# Patient Record
Sex: MALE | Race: WHITE | NOT HISPANIC OR LATINO | URBAN - METROPOLITAN AREA
[De-identification: names, ages, dates, MRNs, and addresses within clinical notes are randomized per-mention and may not be internally consistent; named-entity substitution may affect disease eponyms.]

---

## 2017-10-12 ENCOUNTER — EMERGENCY (EMERGENCY)
Facility: HOSPITAL | Age: 52
LOS: 1 days | Discharge: OTHER INSTITUTION | End: 2017-10-12
Attending: EMERGENCY MEDICINE | Admitting: EMERGENCY MEDICINE
Payer: MEDICAID

## 2017-10-12 ENCOUNTER — EMERGENCY (EMERGENCY)
Facility: HOSPITAL | Age: 52
LOS: 1 days | Discharge: HOME | End: 2017-10-12
Attending: EMERGENCY MEDICINE | Admitting: EMERGENCY MEDICINE
Payer: COMMERCIAL

## 2017-10-12 VITALS
SYSTOLIC BLOOD PRESSURE: 143 MMHG | DIASTOLIC BLOOD PRESSURE: 91 MMHG | TEMPERATURE: 98 F | OXYGEN SATURATION: 100 % | RESPIRATION RATE: 16 BRPM | HEART RATE: 68 BPM

## 2017-10-12 VITALS
RESPIRATION RATE: 16 BRPM | SYSTOLIC BLOOD PRESSURE: 159 MMHG | TEMPERATURE: 98 F | OXYGEN SATURATION: 100 % | DIASTOLIC BLOOD PRESSURE: 90 MMHG | HEART RATE: 54 BPM

## 2017-10-12 VITALS
TEMPERATURE: 99 F | RESPIRATION RATE: 19 BRPM | DIASTOLIC BLOOD PRESSURE: 80 MMHG | SYSTOLIC BLOOD PRESSURE: 148 MMHG | OXYGEN SATURATION: 99 % | HEART RATE: 67 BPM

## 2017-10-12 VITALS
RESPIRATION RATE: 17 BRPM | SYSTOLIC BLOOD PRESSURE: 156 MMHG | DIASTOLIC BLOOD PRESSURE: 104 MMHG | TEMPERATURE: 98 F | HEART RATE: 70 BPM

## 2017-10-12 DIAGNOSIS — S51.801A UNSPECIFIED OPEN WOUND OF RIGHT FOREARM, INITIAL ENCOUNTER: ICD-10-CM

## 2017-10-12 DIAGNOSIS — L08.9 LOCAL INFECTION OF THE SKIN AND SUBCUTANEOUS TISSUE, UNSPECIFIED: ICD-10-CM

## 2017-10-12 DIAGNOSIS — M79.631 PAIN IN RIGHT FOREARM: ICD-10-CM

## 2017-10-12 DIAGNOSIS — B99.9 UNSPECIFIED INFECTIOUS DISEASE: ICD-10-CM

## 2017-10-12 DIAGNOSIS — F17.210 NICOTINE DEPENDENCE, CIGARETTES, UNCOMPLICATED: ICD-10-CM

## 2017-10-12 LAB
ALBUMIN SERPL ELPH-MCNC: 3 G/DL — LOW (ref 3.4–5)
ALP SERPL-CCNC: 108 U/L — SIGNIFICANT CHANGE UP (ref 40–120)
ALT FLD-CCNC: 20 U/L — SIGNIFICANT CHANGE UP (ref 12–42)
ANION GAP SERPL CALC-SCNC: 4 MMOL/L — LOW (ref 9–16)
APTT BLD: 33.1 SEC — SIGNIFICANT CHANGE UP (ref 27.5–37.4)
AST SERPL-CCNC: 25 U/L — SIGNIFICANT CHANGE UP (ref 15–37)
BASOPHILS NFR BLD AUTO: 0.5 % — SIGNIFICANT CHANGE UP (ref 0–2)
BASOPHILS NFR BLD AUTO: 0.5 % — SIGNIFICANT CHANGE UP (ref 0–2)
BILIRUB SERPL-MCNC: 0.2 MG/DL — SIGNIFICANT CHANGE UP (ref 0.2–1.2)
BLD GP AB SCN SERPL QL: NEGATIVE — SIGNIFICANT CHANGE UP
BUN SERPL-MCNC: 16 MG/DL — SIGNIFICANT CHANGE UP (ref 7–23)
CALCIUM SERPL-MCNC: 8.8 MG/DL — SIGNIFICANT CHANGE UP (ref 8.5–10.5)
CHLORIDE SERPL-SCNC: 104 MMOL/L — SIGNIFICANT CHANGE UP (ref 96–108)
CK SERPL-CCNC: 76 U/L — SIGNIFICANT CHANGE UP (ref 39–308)
CO2 SERPL-SCNC: 32 MMOL/L — HIGH (ref 22–31)
CREAT SERPL-MCNC: 1.05 MG/DL — SIGNIFICANT CHANGE UP (ref 0.5–1.3)
EOSINOPHIL NFR BLD AUTO: 6 % — SIGNIFICANT CHANGE UP (ref 0–6)
EOSINOPHIL NFR BLD AUTO: 6.7 % — HIGH (ref 0–6)
GLUCOSE SERPL-MCNC: 120 MG/DL — HIGH (ref 70–99)
HCT VFR BLD CALC: 35.3 % — LOW (ref 39–50)
HCT VFR BLD CALC: 36.7 % — LOW (ref 39–50)
HGB BLD-MCNC: 11 G/DL — LOW (ref 13–17)
HGB BLD-MCNC: 11.2 G/DL — LOW (ref 13–17)
IMM GRANULOCYTES NFR BLD AUTO: 0.2 % — SIGNIFICANT CHANGE UP (ref 0–1.5)
INR BLD: 0.96 — SIGNIFICANT CHANGE UP (ref 0.88–1.16)
LACTATE SERPL-SCNC: 1.3 MMOL/L — SIGNIFICANT CHANGE UP (ref 0.5–2)
LACTATE SERPL-SCNC: 3 MMOL/L — HIGH (ref 0.4–2)
LYMPHOCYTES # BLD AUTO: 29.6 % — SIGNIFICANT CHANGE UP (ref 13–44)
LYMPHOCYTES # BLD AUTO: 32.5 % — SIGNIFICANT CHANGE UP (ref 13–44)
MCHC RBC-ENTMCNC: 25 PG — LOW (ref 27–34)
MCHC RBC-ENTMCNC: 25.9 PG — LOW (ref 27–34)
MCHC RBC-ENTMCNC: 30.5 G/DL — LOW (ref 32–36)
MCHC RBC-ENTMCNC: 31.2 G/DL — LOW (ref 32–36)
MCV RBC AUTO: 81.9 FL — SIGNIFICANT CHANGE UP (ref 80–100)
MCV RBC AUTO: 83.1 FL — SIGNIFICANT CHANGE UP (ref 80–100)
MONOCYTES NFR BLD AUTO: 8.2 % — SIGNIFICANT CHANGE UP (ref 2–14)
MONOCYTES NFR BLD AUTO: 9.2 % — SIGNIFICANT CHANGE UP (ref 2–14)
NEUTROPHILS NFR BLD AUTO: 51.9 % — SIGNIFICANT CHANGE UP (ref 43–77)
NEUTROPHILS NFR BLD AUTO: 54.7 % — SIGNIFICANT CHANGE UP (ref 43–77)
PLATELET # BLD AUTO: 253 K/UL — SIGNIFICANT CHANGE UP (ref 150–400)
PLATELET # BLD AUTO: 284 K/UL — SIGNIFICANT CHANGE UP (ref 150–400)
POTASSIUM SERPL-MCNC: 4.4 MMOL/L — SIGNIFICANT CHANGE UP (ref 3.5–5.3)
POTASSIUM SERPL-SCNC: 4.4 MMOL/L — SIGNIFICANT CHANGE UP (ref 3.5–5.3)
PROT SERPL-MCNC: 7.4 G/DL — SIGNIFICANT CHANGE UP (ref 6.4–8.2)
PROTHROM AB SERPL-ACNC: 10.7 SEC — SIGNIFICANT CHANGE UP (ref 9.8–12.7)
RBC # BLD: 4.25 M/UL — SIGNIFICANT CHANGE UP (ref 4.2–5.8)
RBC # BLD: 4.48 M/UL — SIGNIFICANT CHANGE UP (ref 4.2–5.8)
RBC # FLD: 14.4 % — SIGNIFICANT CHANGE UP (ref 10.3–16.9)
RBC # FLD: 14.6 % — SIGNIFICANT CHANGE UP (ref 10.3–16.9)
RH IG SCN BLD-IMP: NEGATIVE — SIGNIFICANT CHANGE UP
SODIUM SERPL-SCNC: 140 MMOL/L — SIGNIFICANT CHANGE UP (ref 132–145)
WBC # BLD: 6 K/UL — SIGNIFICANT CHANGE UP (ref 3.8–10.5)
WBC # BLD: 6.2 K/UL — SIGNIFICANT CHANGE UP (ref 3.8–10.5)
WBC # FLD AUTO: 6 K/UL — SIGNIFICANT CHANGE UP (ref 3.8–10.5)
WBC # FLD AUTO: 6.2 K/UL — SIGNIFICANT CHANGE UP (ref 3.8–10.5)

## 2017-10-12 PROCEDURE — 85730 THROMBOPLASTIN TIME PARTIAL: CPT

## 2017-10-12 PROCEDURE — 99284 EMERGENCY DEPT VISIT MOD MDM: CPT

## 2017-10-12 PROCEDURE — 93010 ELECTROCARDIOGRAM REPORT: CPT

## 2017-10-12 PROCEDURE — 99285 EMERGENCY DEPT VISIT HI MDM: CPT | Mod: 25

## 2017-10-12 PROCEDURE — 97597 DBRDMT OPN WND 1ST 20 CM/<: CPT

## 2017-10-12 PROCEDURE — 85025 COMPLETE CBC W/AUTO DIFF WBC: CPT

## 2017-10-12 PROCEDURE — 87040 BLOOD CULTURE FOR BACTERIA: CPT

## 2017-10-12 PROCEDURE — 86901 BLOOD TYPING SEROLOGIC RH(D): CPT

## 2017-10-12 PROCEDURE — 73200 CT UPPER EXTREMITY W/O DYE: CPT | Mod: 26,RT

## 2017-10-12 PROCEDURE — 86900 BLOOD TYPING SEROLOGIC ABO: CPT

## 2017-10-12 PROCEDURE — 83605 ASSAY OF LACTIC ACID: CPT

## 2017-10-12 PROCEDURE — 86850 RBC ANTIBODY SCREEN: CPT

## 2017-10-12 PROCEDURE — 85610 PROTHROMBIN TIME: CPT

## 2017-10-12 RX ORDER — VANCOMYCIN HCL 1 G
1000 VIAL (EA) INTRAVENOUS EVERY 12 HOURS
Qty: 0 | Refills: 0 | Status: DISCONTINUED | OUTPATIENT
Start: 2017-10-12 | End: 2017-10-12

## 2017-10-12 RX ORDER — VANCOMYCIN HCL 1 G
1250 VIAL (EA) INTRAVENOUS ONCE
Qty: 0 | Refills: 0 | Status: COMPLETED | OUTPATIENT
Start: 2017-10-12 | End: 2017-10-12

## 2017-10-12 RX ORDER — SODIUM CHLORIDE 9 MG/ML
1000 INJECTION INTRAMUSCULAR; INTRAVENOUS; SUBCUTANEOUS ONCE
Qty: 0 | Refills: 0 | Status: COMPLETED | OUTPATIENT
Start: 2017-10-12 | End: 2017-10-12

## 2017-10-12 RX ORDER — VANCOMYCIN HCL 1 G
1250 VIAL (EA) INTRAVENOUS ONCE
Qty: 0 | Refills: 0 | Status: DISCONTINUED | OUTPATIENT
Start: 2017-10-12 | End: 2017-10-12

## 2017-10-12 RX ADMIN — Medication 166.67 MILLIGRAM(S): at 09:14

## 2017-10-12 RX ADMIN — Medication 100 MILLIGRAM(S): at 08:34

## 2017-10-12 RX ADMIN — SODIUM CHLORIDE 1000 MILLILITER(S): 9 INJECTION INTRAMUSCULAR; INTRAVENOUS; SUBCUTANEOUS at 03:31

## 2017-10-12 NOTE — ED PROVIDER NOTE - SKIN LATERALITY #1
right/forearm, 14 cm X 6cm open wound with area of eschar, induration, erythema, no fluctuance, drainage

## 2017-10-12 NOTE — ED ADULT TRIAGE NOTE - CHIEF COMPLAINT QUOTE
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 Patient was seen and treated with IV abx at [facility] & was lost to follow up. Eschar tissue noted to wound, no purulent d/c, afebrile. SHANNAN SALEH, FROM to extremity.

## 2017-10-12 NOTE — ED ADULT TRIAGE NOTE - CHIEF COMPLAINT QUOTE
Transfer from [facility] for "early necrotizing fascitis".  Received IV abx at [facility].  IVDA & hx of [facility] C.

## 2017-10-12 NOTE — ED PROVIDER NOTE - OBJECTIVE STATEMENT
52 y/o M with Hx IVDU presents for wound check to right forearm 50 y/o M with Hx Hep C and IVDU presents for wound check to right forearm. States he has a progressive wound to his right forearm x 2 months 2/2 IVDU. He was initially admitted and treated with IV ABX at [facility] 2 months ago 52 y/o M with Hx Hep C and IVDU presents for wound check to right forearm. States he has a progressive wound to his right forearm x 2 months 2/2 IVDU. As per pt, he was initially admitted and treated with IV ABX at [facility] 2 months ago and discharged to F/U outpatient closely for wound debridement and further management. States he was unable to F/U due to health insurance barriers and has just been trying to manage his wound with homeopathic remedies however the wound persists.    Denies fever, chills, dizziness, N/V, syncope, UE paresthesias, numbness, weakness

## 2017-10-12 NOTE — ED PROVIDER NOTE - PROGRESS NOTE DETAILS
VSS, getting Vanc/Clinda. Dr. Steven of surgery called back (pt was still waiting for a bed)-. Since the infection (chronic for months) is below the elbow it should go to hand surg. I spoke with Dr. Kennedy who will see the patient in  ED at [facility]. Dr. Hu of [facility] ED aware and accepting. VSS, getting Vanc/Clinda. Dr. Steven of surgery called back (pt was still waiting for a bed)-. Since the infection (chronic for months) is below the elbow it should go to hand surg. I spoke with Dr. Kennedy who will see the patient in th ED at [facility]. Dr. Hu of [facility] ED aware and accepting.    Thre is no crepitus around wound, no recent IVDA in region. There are small pockets of chronically draining pus that could be th tiny air holes seen next to the skin. Seems more consistent with chronic wound with local abscesses and cellulitis and nec fasc clinically.

## 2017-10-12 NOTE — ED PROVIDER NOTE - ATTENDING CONTRIBUTION TO CARE
cellulitis necessitating surgical consultation, pt not adherent to his medication and follow up regimen from prior treatment for this cellulitis

## 2017-10-12 NOTE — ED PROVIDER NOTE - MEDICAL DECISION MAKING DETAILS
Lactate 3.0. CT findings concerning for evolving necrotizing fascitis. Case discussed with Dr. Rodgers who has accepted the pt admission to [facility]. Pt consents to admission.

## 2017-10-12 NOTE — ED PROVIDER NOTE - MEDICAL DECISION MAKING DETAILS
right forearm wound secondary to IVDA right forearm wound secondary to IVDA. Patient evaluated at [facility] and sent for admission to . Patient evaluated in the ED by Dr. Harris.  See OP note. Forearm wound was debrided, pt given instruction for proper wound care, also give material for wound care,  Case management consulted to help arrange f/u care right forearm wound secondary to IVDA. Patient evaluated at [facility] and sent for admission to . Patient evaluated in the ED by Dr. Harris.  See OP note. Forearm wound was debrided, pt given instruction for proper wound care, also give material for wound care,  Case management consulted to help arrange f/u care. PO antibiotics prescribed.

## 2017-10-12 NOTE — ED ADULT NURSE NOTE - NURSING SKIN WOUND APPEAR #1
purulent drainage/bleeding minimally/serosanguinous drainage/eschar noted, indurated around surrounding wound bed

## 2017-10-12 NOTE — ED CLERICAL - NS ED CLERK NOTE PRE-ARRIVAL INFORMATION; ADDITIONAL PRE-ARRIVAL INFORMATION
Blair Loredo- refugio, chronic R forearm cellulitis, ct showing small focus of air, ? small abscesses. Clinda/ vanc given. Surg and plastics aware. Contact Dr. Kennedy on arrival.

## 2017-10-12 NOTE — ED PROVIDER NOTE - OBJECTIVE STATEMENT
50 y/o male present from [facility] for possible admission to hand service DR. Rodarte for infected right forearm wound 52 y/o male present from [facility] for possible admission to hand service DR. Rodarte for infected right forearm wound. Patient is a IVDU with poor follow up for wound care. Denies any fevers. Would has been presents for several weeks. Patient concerned that it is not healing properly.

## 2017-10-12 NOTE — ED ADULT NURSE REASSESSMENT NOTE - NS ED NURSE REASSESS COMMENT FT1
one set of antibiotics and other blood work able to be taken from patient, IV line not established. pt. refused to allow another attempt. Provider Manish made aware.

## 2017-10-12 NOTE — ED ADULT NURSE NOTE - OBJECTIVE STATEMENT
Pt transferred via ambulance from [facility] c/o persistent wound to right forearm x2 months.  Pt is IVDU w/ hx of Hep C.  Pt was originally seen for wound x2 months ago at [facility] and tx'd w/ abx, w/o f/u d/t pt being lost to f/u.  Pt presented to ED at [facility] c/o worsening wound.  Pt was tx'd w/ IV clindamycin and vancomycin and transferred to [facility] for evaluation by Dr. Kennedy for r/o for sx.  Pt VSS, but agitated w/ inability to eat food pending r/o for surgery.  Pt refusing to change into gown or allowing RN to examine wound.  Pt arrived w/ IV to left hand, flushes w/o difficulty.  Pt pending evaluation by surgeon.

## 2017-10-12 NOTE — ED ADULT NURSE REASSESSMENT NOTE - NS ED NURSE REASSESS COMMENT FT1
MARIE Hammond made aware again that second set of blood cultures were unable to be taken from patient. IV line was established but 2nd set of blood cultures unable to be drawn off the line. Pt. refusing butterfly, refusing all additional blood work.

## 2017-10-12 NOTE — ED ADULT NURSE NOTE - OBJECTIVE STATEMENT
pt. aaox3, c/o pt. aaox3, ambulated to ER due to chronic wound on right arm. pt. has a hx of IV drug use, and is usually seen at [facility], pt. fails to go to follow up for wound care. wound has eschar tissue, with small amounts of yellow slough.

## 2017-10-12 NOTE — ED PROVIDER NOTE - SKIN WOUND DESCRIPTION
14cm x 6xm open/ulcerated wound with areas of eschar and surrounding  induration and erythema to right forearm. No active drainage or foul odor 14cm x 6xm open/ulcerated wound with areas of eschar and surrounding  induration and erythema to right forearm. No fluctuance, streaking, active drainage or foul odor

## 2017-10-12 NOTE — ED ADULT NURSE NOTE - CHIEF COMPLAINT QUOTE
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 Patient was seen and treated with IV abx at [facility] & was lost to follow up. Eschar tissue noted to wound, no purulent d/c, afebrile. SHANNAN SALEH, FROM to extremity.

## 2017-10-12 NOTE — ED ADULT NURSE NOTE - CHIEF COMPLAINT QUOTE
Transfer from [facility] for "early necrotizing fascitis".  Received IV abx at [facility].  IVDA & hx of [facility] C.

## 2017-10-13 LAB
-  COAGULASE NEGATIVE STAPHYLOCOCCUS: SIGNIFICANT CHANGE UP
GRAM STN FLD: SIGNIFICANT CHANGE UP
METHOD TYPE: SIGNIFICANT CHANGE UP

## 2017-10-16 LAB
-  CEFAZOLIN: SIGNIFICANT CHANGE UP
-  CLINDAMYCIN: SIGNIFICANT CHANGE UP
-  ERYTHROMYCIN: SIGNIFICANT CHANGE UP
-  LINEZOLID: SIGNIFICANT CHANGE UP
-  OXACILLIN: SIGNIFICANT CHANGE UP
-  PENICILLIN: SIGNIFICANT CHANGE UP
-  RIFAMPIN: SIGNIFICANT CHANGE UP
-  TRIMETHOPRIM/SULFAMETHOXAZOLE: SIGNIFICANT CHANGE UP
-  VANCOMYCIN: SIGNIFICANT CHANGE UP
METHOD TYPE: SIGNIFICANT CHANGE UP

## 2017-10-17 LAB
CULTURE RESULTS: SIGNIFICANT CHANGE UP
ORGANISM # SPEC MICROSCOPIC CNT: SIGNIFICANT CHANGE UP
SPECIMEN SOURCE: SIGNIFICANT CHANGE UP

## 2020-08-19 ENCOUNTER — EMERGENCY (EMERGENCY)
Facility: HOSPITAL | Age: 55
LOS: 1 days | Discharge: HOME | End: 2020-08-19
Attending: EMERGENCY MEDICINE | Admitting: EMERGENCY MEDICINE
Payer: MEDICAID

## 2020-08-19 VITALS
TEMPERATURE: 98 F | OXYGEN SATURATION: 96 % | RESPIRATION RATE: 18 BRPM | HEART RATE: 70 BPM | SYSTOLIC BLOOD PRESSURE: 155 MMHG | DIASTOLIC BLOOD PRESSURE: 78 MMHG
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
## 2020-08-19 DIAGNOSIS — S93.421A SPRAIN OF DELTOID LIGAMENT OF RIGHT ANKLE, INITIAL ENCOUNTER: ICD-10-CM
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
## 2020-08-19 DIAGNOSIS — B35.3 TINEA PEDIS: ICD-10-CM

## 2020-08-19 DIAGNOSIS — M25.572 PAIN IN LEFT ANKLE AND JOINTS OF LEFT FOOT: ICD-10-CM
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
## 2020-08-19 PROBLEM — F19.90 OTHER PSYCHOACTIVE SUBSTANCE USE, UNSPECIFIED, UNCOMPLICATED: Chronic | Status: ACTIVE | Noted: 2017-10-12

## 2020-08-19 PROBLEM — B19.20 UNSPECIFIED VIRAL HEPATITIS C WITHOUT HEPATIC COMA: Chronic | Status: ACTIVE | Noted: 2017-10-12

## 2020-08-19 LAB
ANION GAP SERPL CALC-SCNC: 10 MMOL/L — SIGNIFICANT CHANGE UP (ref 9–16)
BUN SERPL-MCNC: 11 MG/DL — SIGNIFICANT CHANGE UP (ref 7–23)
CALCIUM SERPL-MCNC: 9.2 MG/DL — SIGNIFICANT CHANGE UP (ref 8.5–10.5)
CHLORIDE SERPL-SCNC: 103 MMOL/L — SIGNIFICANT CHANGE UP (ref 96–108)
CO2 SERPL-SCNC: 26 MMOL/L — SIGNIFICANT CHANGE UP (ref 22–31)
CREAT SERPL-MCNC: 0.97 MG/DL — SIGNIFICANT CHANGE UP (ref 0.5–1.3)
CRP SERPL-MCNC: 6.8 MG/DL — HIGH (ref 0–0.9)
GLUCOSE SERPL-MCNC: 97 MG/DL — SIGNIFICANT CHANGE UP (ref 70–99)
HCT VFR BLD CALC: 38.6 % — LOW (ref 39–50)
HGB BLD-MCNC: 12.1 G/DL — LOW (ref 13–17)
MCHC RBC-ENTMCNC: 25.7 PG — LOW (ref 27–34)
MCHC RBC-ENTMCNC: 31.3 GM/DL — LOW (ref 32–36)
MCV RBC AUTO: 82 FL — SIGNIFICANT CHANGE UP (ref 80–100)
NRBC # BLD: 0 /100 WBCS — SIGNIFICANT CHANGE UP (ref 0–0)
PLATELET # BLD AUTO: 349 K/UL — SIGNIFICANT CHANGE UP (ref 150–400)
POTASSIUM SERPL-MCNC: 4.3 MMOL/L — SIGNIFICANT CHANGE UP (ref 3.5–5.3)
POTASSIUM SERPL-SCNC: 4.3 MMOL/L — SIGNIFICANT CHANGE UP (ref 3.5–5.3)
RBC # BLD: 4.71 M/UL — SIGNIFICANT CHANGE UP (ref 4.2–5.8)
RBC # FLD: 15.5 % — HIGH (ref 10.3–14.5)
SODIUM SERPL-SCNC: 139 MMOL/L — SIGNIFICANT CHANGE UP (ref 132–145)
WBC # BLD: 7.26 K/UL — SIGNIFICANT CHANGE UP (ref 3.8–10.5)
WBC # FLD AUTO: 7.26 K/UL — SIGNIFICANT CHANGE UP (ref 3.8–10.5)

## 2020-08-19 PROCEDURE — 73620 X-RAY EXAM OF FOOT: CPT | Mod: 26,LT

## 2020-08-19 PROCEDURE — 73600 X-RAY EXAM OF ANKLE: CPT | Mod: 26,LT

## 2020-08-19 PROCEDURE — 99284 EMERGENCY DEPT VISIT MOD MDM: CPT | Mod: 25

## 2020-08-19 RX ORDER — CLOTRIMAZOLE AND BETAMETHASONE DIPROPIONATE 10; .5 MG/G; MG/G
1 CREAM TOPICAL
Qty: 30 | Refills: 0
Start: 2020-08-19 | End: 2020-09-01

## 2020-08-19 NOTE — ED PROVIDER NOTE - CLINICAL SUMMARY MEDICAL DECISION MAKING FREE TEXT BOX
Probable ankle sprain, however, given Patient's recent history, will treat pain, obtain radiographs, and labs.

## 2020-08-19 NOTE — ED PROVIDER NOTE - OBJECTIVE STATEMENT
53 y/o male with PMHx of recent osteomyelitis of left ankle (with inpatient stay 1 month ago, finished outpatient doxycycline 2 weeks ago) presents to the ED with complaints of left medial ankle pain after recent inversion ankle injury 3 days ago after stepping into a pothole. Endorses mild pain and concern for recurrence of osteomyelitis. Denies fever, chills, or wounds. Able to ambulate but states it is painful. Denies N/V.

## 2020-08-19 NOTE — ED PROVIDER NOTE - DIAGNOSTIC INTERPRETATION
Radiology Interpretation performed by NILES FAUST Ankle x-ray, 2 views  L Foot x-ray, AP + Lateral  No fracture, no dislocation (joint spaces grossly normal), no Foreign Body noted, soft tissue normal

## 2020-08-19 NOTE — ED PROVIDER NOTE - PATIENT PORTAL LINK FT
You can access the FollowMyHealth Patient Portal offered by [facility] by registering at the following website: [email or web address]. By joining [facility]’s FollowMyHealth portal, you will also be able to view your health information using other applications (apps) compatible with our system.

## 2020-08-19 NOTE — ED ADULT NURSE NOTE - OBJECTIVE STATEMENT
walk in c/o worsening left ankle pain x2 days. As per pt he was taking PO clindamyacin x2weeks, finished 2 days PTA for " osteo infection" and then rolled same ankle in the street. Pt initially states + abx compliance but then admitted to sometimes forgetting d/t illicit drug use (snorting opiates) and also vomiting up the medications d/t being "dope sick". Noted severe scarring to b/l upper extremities d/t many years of IVDA.

## 2020-08-19 NOTE — ED PROVIDER NOTE - SKIN, MLM
Sharp, demarcated interdigital rash of left toes and distal portion of foot on plantar aspect consistent with tinea.

## 2020-08-19 NOTE — ED PROVIDER NOTE - MUSCULOSKELETAL, MLM
Mild soft tissue swelling over the entire foot and ankle with mild tenderness of medial deltoid ligament. No erythema, warmth, or lymphangitis. No calf induration, swelling, or pain.

## 2020-08-19 NOTE — ED PROVIDER NOTE - CARE PLAN
Principal Discharge DX:	Tinea pedis of left foot  Secondary Diagnosis:	Sprain of posterior talofibular ligament of left ankle, initial encounter

## 2020-08-19 NOTE — ED ADULT TRIAGE NOTE - CHIEF COMPLAINT QUOTE
pt presents with worsening left ankle pain times two days. states that he was taking doxycycline for an infection of the ankle and finished about a week ago.

## 2020-08-20 LAB — URATE SERPL-MCNC: 5.2 MG/DL — SIGNIFICANT CHANGE UP (ref 3.4–8.8)

## 2022-11-27 NOTE — ED PROVIDER NOTE - CHIEF COMPLAINT
Ongoing SW/CM Assessment/Plan of Care Note     See SW/CM flowsheets for goals and other objective data.     Progress note:   CM informed by patient RN that patient has dementia and suggestion made to contact patient's daughter. Per Dr. Reyna patient is oriented to self only and unable to follow commands.CM left vm message for patient's daughter, Fariha Venegas ([phone]) with plan to complete discharge assessment.      received an order for HH RN/PT/OT. A Referral was sent to [facility] [phone]. The accepting agency is not yet determined. Clinical and financial acceptance is pending.          The patient is a 51y Male complaining of pain, forearm.